# Patient Record
Sex: FEMALE | Race: WHITE | NOT HISPANIC OR LATINO | Employment: STUDENT | URBAN - METROPOLITAN AREA
[De-identification: names, ages, dates, MRNs, and addresses within clinical notes are randomized per-mention and may not be internally consistent; named-entity substitution may affect disease eponyms.]

---
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
## 2019-07-27 ENCOUNTER — TELEPHONE (OUTPATIENT)
Dept: OBSTETRICS AND GYNECOLOGY | Facility: CLINIC | Age: 19
End: 2019-07-27

## 2019-07-27 NOTE — TELEPHONE ENCOUNTER
Pt called c/o itching and yeast infection after using new soap. Requesting medication to be sent in. Informed pt per NP that she will send something in but if symptoms continue she would need to come in to be evaluated. Pt agree.

## 2019-07-29 ENCOUNTER — TELEPHONE (OUTPATIENT)
Dept: OBSTETRICS AND GYNECOLOGY | Facility: CLINIC | Age: 19
End: 2019-07-29

## 2019-07-29 RX ORDER — FLUCONAZOLE 150 MG/1
150 TABLET ORAL DAILY
Qty: 1 TABLET | Refills: 1 | Status: SHIPPED | OUTPATIENT
Start: 2019-07-29 | End: 2019-07-30

## 2019-07-29 NOTE — TELEPHONE ENCOUNTER
----- Message from Casey Serrano MA sent at 7/26/2019  2:03 PM CDT -----    Please refill the medication(s) listed below. Please call the patient when the prescription(s) is ready for  at this phone number   [phone] (H)       Medication #1   fluconazole (DIFLUCAN) 150 MG Tab           Preferred Pharmacy: [id number] - [place] [address] [address] AT [facility] [address]     [phone] (Phone)   [phone] (Fax)

## 2019-09-12 ENCOUNTER — OFFICE VISIT (OUTPATIENT)
Dept: OBSTETRICS AND GYNECOLOGY | Facility: CLINIC | Age: 19
End: 2019-09-12
Payer: MEDICAID

## 2019-09-12 VITALS
HEIGHT: 64 IN | SYSTOLIC BLOOD PRESSURE: 100 MMHG | DIASTOLIC BLOOD PRESSURE: 60 MMHG | WEIGHT: 227.06 LBS | BODY MASS INDEX: 38.76 KG/M2

## 2019-09-12 DIAGNOSIS — Z30.41 SURVEILLANCE FOR BIRTH CONTROL, ORAL CONTRACEPTIVES: Primary | ICD-10-CM

## 2019-09-12 DIAGNOSIS — Z01.419 WOMEN'S ANNUAL ROUTINE GYNECOLOGICAL EXAMINATION: ICD-10-CM

## 2019-09-12 PROCEDURE — 99213 OFFICE O/P EST LOW 20 MIN: CPT | Mod: PBBFAC | Performed by: NURSE PRACTITIONER

## 2019-09-12 PROCEDURE — 99395 PR PREVENTIVE VISIT,EST,18-39: ICD-10-PCS | Mod: S$PBB,,, | Performed by: NURSE PRACTITIONER

## 2019-09-12 PROCEDURE — 99999 PR PBB SHADOW E&M-EST. PATIENT-LVL III: CPT | Mod: PBBFAC,,, | Performed by: NURSE PRACTITIONER

## 2019-09-12 PROCEDURE — 99999 PR PBB SHADOW E&M-EST. PATIENT-LVL III: ICD-10-PCS | Mod: PBBFAC,,, | Performed by: NURSE PRACTITIONER

## 2019-09-12 PROCEDURE — 99395 PREV VISIT EST AGE 18-39: CPT | Mod: S$PBB,,, | Performed by: NURSE PRACTITIONER

## 2019-09-12 RX ORDER — NORGESTIMATE AND ETHINYL ESTRADIOL 0.25-0.035
1 KIT ORAL DAILY
Qty: 30 TABLET | Refills: 11 | Status: SHIPPED | OUTPATIENT
Start: 2019-09-12 | End: 2020-07-07 | Stop reason: SDUPTHER

## 2019-09-12 NOTE — PROGRESS NOTES
CC: Annual  HPI: Pt is a 19 y.o.  female who presents for routine annual exam. She uses OCPs for contraception, she reports satisfaction with this method and is requesting a refill. She does not want STD screening.  Denies any GYN complaints.  The patient participates in regular exercise: yes.  The patient does not smoke.  The patient wears seatbelts.   Pt denies any domestic violence.    No history of HTN/CVA/DVT/PE or migraine with aura. Occasional marijuana.     FH:  Breast cancer: Paternal Great Grandmother  Colon cancer: none  Ovarian cancer: none  Endometrial cancer: none    ROS:  GENERAL: Feeling well overall. Denies fever or chills.   SKIN: Denies rash or lesions.   HEAD: Denies head injury or headache.   NODES: Denies enlarged lymph nodes.   CHEST: Denies chest pain or shortness of breath.   CARDIOVASCULAR: Denies palpitations or left sided chest pain.   ABDOMEN: No abdominal pain, constipation, diarrhea, nausea, vomiting or rectal bleeding.   URINARY: No dysuria, hematuria, or burning on urination.  REPRODUCTIVE: See HPI.   BREASTS: Denies pain, lumps, or nipple discharge.   HEMATOLOGIC: No easy bruisability or excessive bleeding.   MUSCULOSKELETAL: Denies joint pain or swelling.   NEUROLOGIC: Denies syncope or weakness.   PSYCHIATRIC: Denies depression, anxiety or mood swings.    PE:   APPEARANCE: Well nourished, well developed, White female in no acute distress.  NODES: no cervical, supraclavicular, or inguinal lymphadenopathy  BREASTS: Symmetrical, no skin changes or visible lesions. No palpable masses, nipple discharge or adenopathy bilaterally.  ABDOMEN: Soft. No tenderness or masses. No distention. No hernias palpated. No CVA tenderness.  VULVA: No lesions. Normal external female genitalia.  URETHRAL MEATUS: Normal size and location, no lesions, no prolapse.  URETHRA: No masses, tenderness, or prolapse.  VAGINA: Moist. No lesions or lacerations noted. No abnormal discharge present. No odor present.    CERVIX: No lesions or discharge. No cervical motion tenderness.   UTERUS: Normal size, regular shape, mobile, non-tender.  ADNEXA: No tenderness. No fullness or masses palpated in the adnexal regions.   ANUS PERINEUM: Normal.      Diagnosis:  1. Surveillance for birth control, oral contraceptives    2. Women's annual routine gynecological examination        Plan:     Orders Placed This Encounter    norgestimate-ethinyl estradiol (ORTHO-CYCLEN) 0.25-35 mg-mcg per tablet     Ortho Cyclen refill.    The use of the oral contraceptive has been fully discussed with the patient. This includes the proper method to initiate and continue the pills, the need for regular compliance to ensure adequate contraceptive effect, the physiology which make the pill effective, the instructions for what to do in event of a missed pill, and warnings about anticipated minor side effects such as breakthrough spotting, nausea, breast tenderness, weight changes, acne, headaches, etc.  She has been told of the more serious potential side effects such as MI, stroke, and deep vein thrombosis, all of which are very unlikely.  She has been asked to report any signs of such serious problems immediately.  She should back up the pill with a condom during any cycle in which antibiotics are prescribed, and during the first cycle as well. The need for additional protection, such as a condom, to prevent exposure to sexually transmitted diseases has also been discussed- the patient has been clearly reminded that OCP's cannot protect her against diseases such as HIV and others. She understands and wishes to take the medication as prescribed.     Patient was counseled today on the new ACS guidelines for cervical cytology screening as well as the current recommendations for breast cancer screening. She was counseled to follow up with her PCP for other routine health maintenance. Counseling session lasted approximately 10 minutes, and all her questions were  answered.    Follow-up with me in 1 year for routine exam      Dory Buckley, RYAN-C

## 2020-07-06 ENCOUNTER — TELEPHONE (OUTPATIENT)
Dept: OBSTETRICS AND GYNECOLOGY | Facility: CLINIC | Age: 20
End: 2020-07-06

## 2020-07-06 NOTE — TELEPHONE ENCOUNTER
Spoke with pt advised she was prescribed 11 refills and to contact pharmacy for refill. Pt voiced understanding with no questions.

## 2020-07-06 NOTE — TELEPHONE ENCOUNTER
----- Message from Li Andrade sent at 7/6/2020 11:59 AM CDT -----  Regarding: refills  Pt Is Calling for Refills On The Following Medications       norgestimate-ethinyl estradiol (ORTHO-CYCLEN) 0.25-35 mg-mcg per tablet 30 tablet 11 9/12/2019 9/11/2020 No  Sig - Route: Take 1 tablet by mouth once daily. - Oral  Sent to pharmacy as: norgestimate-ethinyl estradiol (ORTHO-CYCLEN) 0.25-35 mg-mcg per tablet  Class: Normal            Called Into [facility] phone # [phone]      Pt Can Be Reached At [phone]

## 2020-07-07 ENCOUNTER — TELEPHONE (OUTPATIENT)
Dept: OBSTETRICS AND GYNECOLOGY | Facility: CLINIC | Age: 20
End: 2020-07-07

## 2020-07-07 DIAGNOSIS — Z01.419 WOMEN'S ANNUAL ROUTINE GYNECOLOGICAL EXAMINATION: ICD-10-CM

## 2020-07-07 DIAGNOSIS — Z30.41 SURVEILLANCE FOR BIRTH CONTROL, ORAL CONTRACEPTIVES: ICD-10-CM

## 2020-07-07 RX ORDER — NORGESTIMATE AND ETHINYL ESTRADIOL 0.25-0.035
1 KIT ORAL DAILY
Qty: 30 TABLET | Refills: 1 | Status: SHIPPED | OUTPATIENT
Start: 2020-07-07 | End: 2020-08-26 | Stop reason: SDUPTHER

## 2020-07-07 NOTE — TELEPHONE ENCOUNTER
Spoke with pt advised 2 more refills will be sent in to pharmacy, however she will need to make an appointment for her annual to continue to receive birth control. Pt voiced understanding with no questions.

## 2020-07-07 NOTE — TELEPHONE ENCOUNTER
----- Message from Arlene Ptitman sent at 7/7/2020 11:15 AM CDT -----  Patient is calling in reference to her Birthcontrol she spoke with someone yesterday and they stated she should have enough refills but pharmacy is telling her she needs another authorization.    Patient can be reached at [phone]

## 2020-08-26 DIAGNOSIS — Z30.41 SURVEILLANCE FOR BIRTH CONTROL, ORAL CONTRACEPTIVES: ICD-10-CM

## 2020-08-26 DIAGNOSIS — Z01.419 WOMEN'S ANNUAL ROUTINE GYNECOLOGICAL EXAMINATION: ICD-10-CM

## 2020-08-26 RX ORDER — NORGESTIMATE AND ETHINYL ESTRADIOL 0.25-0.035
1 KIT ORAL DAILY
Qty: 30 TABLET | Refills: 1 | Status: SHIPPED | OUTPATIENT
Start: 2020-08-26 | End: 2020-09-30 | Stop reason: SDUPTHER

## 2020-08-26 NOTE — TELEPHONE ENCOUNTER
----- Message from Arlene Pittman sent at 8/26/2020 11:08 AM CDT -----    Patient is requesting a refill on her birthcontrol she does not have enough to last until her appt.    Patient can be reached at [phone]        Patient pharmacy [id number] [place] [facility] [place] [facility] [address] [phone] (Phone) [phone] (Fax)

## 2020-08-26 NOTE — TELEPHONE ENCOUNTER
----- Message from Arlene Pittman sent at 8/26/2020 11:08 AM CDT -----    Patient is requesting a refill on her birthcontrol she does not have enough to last until her appt.    Patient can be reached at [phone]        Patient pharmacy is [id number] [facility] [place] [facility] [address] [phone] (Phone) [phone] (Fax)

## 2020-09-12 ENCOUNTER — NURSE TRIAGE (OUTPATIENT)
Dept: ADMINISTRATIVE | Facility: CLINIC | Age: 20
End: 2020-09-12

## 2020-09-12 NOTE — TELEPHONE ENCOUNTER
Reason for Disposition   Blood in urine (red, pink, or tea-colored)     TAKING AZO yesterday, urine orange, saw pink in the urine prior to the AZO    Additional Information   Negative: Shock suspected (e.g., cold/pale/clammy skin, too weak to stand, low BP, rapid pulse)   Negative: Sounds like a life-threatening emergency to the triager   Negative: Followed a genital area injury   Negative: Taking antibiotic for urinary tract infection (UTI)   Negative: Pregnant   Negative: Postpartum < 1 month   Negative: [1] Unable to urinate (or only a few drops) > 4 hours AND     [2] bladder feels very full (e.g., palpable bladder or strong urge to urinate)   Negative: Patient sounds very sick or weak to the triager   Negative: [1] SEVERE pain with urination  (e.g., excruciating) AND [2] not improved after 2 hours of pain medicine and Sitz bath   Negative: Fever > 100.5 F (38.1 C)   Negative: Side (flank) or lower back pain present   Negative: Diabetes mellitus or weak immune system (e.g., HIV positive, cancer chemotherapy, transplant patient)   Negative: Bedridden (e.g., nursing home patient, CVA, chronic illness, recovering from surgery)   Negative: Artificial heart valve or artificial joint   Negative: Unusual vaginal discharge (e.g., bad smelling, yellow, green, or foamy-white)   Negative: Patient is worried about sexually transmitted disease (STD)   Negative: Possibility of pregnancy    Protocols used: ST URINATION PAIN - FEMALE-A-    Recommend she be seen within 24 hours for urinary urgency, burning, frequency.  No fever, but did have some pink tinge to urine, this was prior to taking AZO, which she took last night for the discomfort.  Now her urine is orange.  Provided location and phone number for [facility].  She also knows of others near \[facility]", which is where she lives.

## 2020-09-30 ENCOUNTER — LAB VISIT (OUTPATIENT)
Dept: LAB | Facility: OTHER | Age: 20
End: 2020-09-30
Attending: NURSE PRACTITIONER
Payer: MEDICAID

## 2020-09-30 ENCOUNTER — OFFICE VISIT (OUTPATIENT)
Dept: OBSTETRICS AND GYNECOLOGY | Facility: CLINIC | Age: 20
End: 2020-09-30
Payer: MEDICAID

## 2020-09-30 VITALS
HEIGHT: 64 IN | SYSTOLIC BLOOD PRESSURE: 140 MMHG | DIASTOLIC BLOOD PRESSURE: 80 MMHG | BODY MASS INDEX: 36.4 KG/M2 | WEIGHT: 213.19 LBS

## 2020-09-30 DIAGNOSIS — Z11.3 SCREENING EXAMINATION FOR STD (SEXUALLY TRANSMITTED DISEASE): ICD-10-CM

## 2020-09-30 DIAGNOSIS — Z01.419 WOMEN'S ANNUAL ROUTINE GYNECOLOGICAL EXAMINATION: ICD-10-CM

## 2020-09-30 DIAGNOSIS — Z01.419 WOMEN'S ANNUAL ROUTINE GYNECOLOGICAL EXAMINATION: Primary | ICD-10-CM

## 2020-09-30 DIAGNOSIS — Z30.41 SURVEILLANCE FOR BIRTH CONTROL, ORAL CONTRACEPTIVES: ICD-10-CM

## 2020-09-30 DIAGNOSIS — Z12.4 PAP SMEAR FOR CERVICAL CANCER SCREENING: ICD-10-CM

## 2020-09-30 LAB
ALBUMIN SERPL BCP-MCNC: 4.3 G/DL (ref 3.5–5.2)
ALP SERPL-CCNC: 45 U/L (ref 55–135)
ALT SERPL W/O P-5'-P-CCNC: 11 U/L (ref 10–44)
ANION GAP SERPL CALC-SCNC: 12 MMOL/L (ref 8–16)
AST SERPL-CCNC: 13 U/L (ref 10–40)
BASOPHILS # BLD AUTO: 0.06 K/UL (ref 0–0.2)
BASOPHILS NFR BLD: 0.7 % (ref 0–1.9)
BILIRUB SERPL-MCNC: 0.5 MG/DL (ref 0.1–1)
BUN SERPL-MCNC: 9 MG/DL (ref 6–20)
CALCIUM SERPL-MCNC: 10 MG/DL (ref 8.7–10.5)
CHLORIDE SERPL-SCNC: 103 MMOL/L (ref 95–110)
CO2 SERPL-SCNC: 26 MMOL/L (ref 23–29)
CREAT SERPL-MCNC: 0.7 MG/DL (ref 0.5–1.4)
DIFFERENTIAL METHOD: ABNORMAL
EOSINOPHIL # BLD AUTO: 0.1 K/UL (ref 0–0.5)
EOSINOPHIL NFR BLD: 1.2 % (ref 0–8)
ERYTHROCYTE [DISTWIDTH] IN BLOOD BY AUTOMATED COUNT: 13.5 % (ref 11.5–14.5)
EST. GFR  (AFRICAN AMERICAN): >60 ML/MIN/1.73 M^2
EST. GFR  (NON AFRICAN AMERICAN): >60 ML/MIN/1.73 M^2
GLUCOSE SERPL-MCNC: 102 MG/DL (ref 70–110)
HCT VFR BLD AUTO: 42.7 % (ref 37–48.5)
HGB BLD-MCNC: 13.7 G/DL (ref 12–16)
IMM GRANULOCYTES # BLD AUTO: 0.02 K/UL (ref 0–0.04)
IMM GRANULOCYTES NFR BLD AUTO: 0.2 % (ref 0–0.5)
LYMPHOCYTES # BLD AUTO: 1.9 K/UL (ref 1–4.8)
LYMPHOCYTES NFR BLD: 23.8 % (ref 18–48)
MCH RBC QN AUTO: 27.2 PG (ref 27–31)
MCHC RBC AUTO-ENTMCNC: 32.1 G/DL (ref 32–36)
MCV RBC AUTO: 85 FL (ref 82–98)
MONOCYTES # BLD AUTO: 0.7 K/UL (ref 0.3–1)
MONOCYTES NFR BLD: 8.6 % (ref 4–15)
NEUTROPHILS # BLD AUTO: 5.3 K/UL (ref 1.8–7.7)
NEUTROPHILS NFR BLD: 65.5 % (ref 38–73)
NRBC BLD-RTO: 0 /100 WBC
PLATELET # BLD AUTO: 372 K/UL (ref 150–350)
PMV BLD AUTO: 10.7 FL (ref 9.2–12.9)
POTASSIUM SERPL-SCNC: 4.2 MMOL/L (ref 3.5–5.1)
PROT SERPL-MCNC: 7.5 G/DL (ref 6–8.4)
RBC # BLD AUTO: 5.04 M/UL (ref 4–5.4)
SODIUM SERPL-SCNC: 141 MMOL/L (ref 136–145)
WBC # BLD AUTO: 8.06 K/UL (ref 3.9–12.7)

## 2020-09-30 PROCEDURE — 87480 CANDIDA DNA DIR PROBE: CPT

## 2020-09-30 PROCEDURE — 99999 PR PBB SHADOW E&M-EST. PATIENT-LVL III: CPT | Mod: PBBFAC,,, | Performed by: NURSE PRACTITIONER

## 2020-09-30 PROCEDURE — 80074 ACUTE HEPATITIS PANEL: CPT

## 2020-09-30 PROCEDURE — 99395 PREV VISIT EST AGE 18-39: CPT | Mod: S$PBB,,, | Performed by: NURSE PRACTITIONER

## 2020-09-30 PROCEDURE — 87510 GARDNER VAG DNA DIR PROBE: CPT

## 2020-09-30 PROCEDURE — 99213 OFFICE O/P EST LOW 20 MIN: CPT | Mod: PBBFAC | Performed by: NURSE PRACTITIONER

## 2020-09-30 PROCEDURE — 88142 CYTOPATH C/V THIN LAYER: CPT

## 2020-09-30 PROCEDURE — 85025 COMPLETE CBC W/AUTO DIFF WBC: CPT

## 2020-09-30 PROCEDURE — 99999 PR PBB SHADOW E&M-EST. PATIENT-LVL III: ICD-10-PCS | Mod: PBBFAC,,, | Performed by: NURSE PRACTITIONER

## 2020-09-30 PROCEDURE — 86703 HIV-1/HIV-2 1 RESULT ANTBDY: CPT

## 2020-09-30 PROCEDURE — 36415 COLL VENOUS BLD VENIPUNCTURE: CPT

## 2020-09-30 PROCEDURE — 86592 SYPHILIS TEST NON-TREP QUAL: CPT

## 2020-09-30 PROCEDURE — 99395 PR PREVENTIVE VISIT,EST,18-39: ICD-10-PCS | Mod: S$PBB,,, | Performed by: NURSE PRACTITIONER

## 2020-09-30 PROCEDURE — 80053 COMPREHEN METABOLIC PANEL: CPT

## 2020-09-30 RX ORDER — NORGESTIMATE AND ETHINYL ESTRADIOL 0.25-0.035
1 KIT ORAL DAILY
Qty: 30 TABLET | Refills: 1 | Status: SHIPPED | OUTPATIENT
Start: 2020-09-30 | End: 2021-09-30

## 2020-09-30 NOTE — PROGRESS NOTES
CC: Annual  HPI: Pt is a 20 y.o.  female who presents for routine annual exam. She uses OCPs for contraception. She does want STD screening.  Denies any GYN complaints.  The patient participates in regular exercise: Yes.  The patient does not smoke.  The patient wears seatbelts.   Pt denies any domestic violence.     FH:  Breast cancer: none  Colon cancer: none  Ovarian cancer: none  Endometrial cancer: none    ROS:  GENERAL: Feeling well overall. Denies fever or chills.   SKIN: Denies rash or lesions.   HEAD: Denies head injury or headache.   NODES: Denies enlarged lymph nodes.   CHEST: Denies chest pain or shortness of breath.   CARDIOVASCULAR: Denies palpitations or left sided chest pain.   ABDOMEN: No abdominal pain, constipation, diarrhea, nausea, vomiting or rectal bleeding.   URINARY: No dysuria, hematuria, or burning on urination.  REPRODUCTIVE: See HPI.   BREASTS: Denies pain, lumps, or nipple discharge.   HEMATOLOGIC: No easy bruisability or excessive bleeding.   MUSCULOSKELETAL: Denies joint pain or swelling.   NEUROLOGIC: Denies syncope or weakness.   PSYCHIATRIC: Denies depression, anxiety or mood swings.    PE:   APPEARANCE: Well nourished, well developed, White female in no acute distress.  NODES: no cervical, supraclavicular, or inguinal lymphadenopathy  BREASTS: Symmetrical, no skin changes or visible lesions. No palpable masses, nipple discharge or adenopathy bilaterally.  ABDOMEN: Soft. No tenderness or masses. No distention. No hernias palpated. No CVA tenderness.  VULVA: No lesions. Normal external female genitalia.  URETHRAL MEATUS: Normal size and location, no lesions, no prolapse.  URETHRA: No masses, tenderness, or prolapse.  VAGINA: Moist. No lesions or lacerations noted. No abnormal discharge present. No odor present.   CERVIX: No lesions or discharge. No cervical motion tenderness.   UTERUS: Normal size, regular shape, mobile, non-tender.  ADNEXA: No tenderness. No fullness or masses  palpated in the adnexal regions.   ANUS PERINEUM: Normal.      Diagnosis:  1. Women's annual routine gynecological examination    2. Screening examination for STD (sexually transmitted disease)    3. Pap smear for cervical cancer screening    4. Surveillance for birth control, oral contraceptives        Plan:     Orders Placed This Encounter    C. trachomatis/N. gonorrhoeae by AMP DNA [facility]; Cervicovaginal    Vaginosis Screen by DNA Probe    HIV 1/2 Ag/Ab (4th Gen)    RPR    Hepatitis Panel, Acute    CBC auto differential    Comprehensive metabolic panel    Liquid-Based Pap Smear, Screening    norgestimate-ethinyl estradioL (ORTHO-CYCLEN) 0.25-35 mg-mcg per tablet       Patient was counseled today on the new ACS guidelines for cervical cytology screening as well as the current recommendations for breast cancer screening. She was counseled to follow up with her PCP for other routine health maintenance. Counseling session lasted approximately 10 minutes, and all her questions were answered.    Follow-up with me in 1 year for routine exam      JEFF Calabrese     2018

## 2020-09-30 NOTE — MEDICAL/APP STUDENT
"Subjective:       Patient ID: Kerry Torres is a 20 y.o. female.    Chief Complaint: Annual Exam (STD check)    Patient denies any acute problems apart from headaches during first "couple days of my cycle". Reports relief with OTC medication. Denies dysuria, vaginal discharge or foul odor, abdominal cramping or pain. Patient exercises regularly and follows a healthy diet. Reports consistent condom use with monogamous partner of four years. Denies any sexual or domestic violence issues. Smokes marijuana occasionally but denies alcohol or tobacco use.     Review of Systems   Constitutional: Negative.    HENT: Negative.    Eyes: Negative.    Respiratory: Negative.    Gastrointestinal: Negative.    Endocrine: Negative.    Genitourinary: Negative.    Musculoskeletal: Negative.    Integumentary:  Negative.   Allergic/Immunologic: Negative.    Neurological: Positive for headaches.   Hematological: Negative.    Psychiatric/Behavioral: Negative.          Objective:      Physical Exam  Exam conducted with a chaperone present.   Constitutional:       Appearance: Normal appearance.   HENT:      Head: Normocephalic.      Nose: Nose normal.      Mouth/Throat:      Mouth: Mucous membranes are moist.      Pharynx: Oropharynx is clear.   Eyes:      Conjunctiva/sclera: Conjunctivae normal.   Neck:      Musculoskeletal: Normal range of motion and neck supple.      Thyroid: No thyroid mass, thyromegaly or thyroid tenderness.      Trachea: Trachea normal.   Cardiovascular:      Pulses: Normal pulses.   Pulmonary:      Effort: Pulmonary effort is normal.      Breath sounds: Normal breath sounds.   Chest:      Breasts:         Right: Normal.         Left: Normal.   Abdominal:      General: Abdomen is flat.      Palpations: Abdomen is soft.   Genitourinary:     General: Normal vulva.      Exam position: Lithotomy position.      Vagina: Vaginal discharge present.      Rectum: Normal.      Comments: Aditya blood, patient on fourth day of " menstrual cycle   Musculoskeletal: Normal range of motion.      Right lower leg: No edema.      Left lower leg: No edema.   Skin:     General: Skin is warm and dry.      Capillary Refill: Capillary refill takes less than 2 seconds.   Neurological:      General: No focal deficit present.      Mental Status: She is alert.   Psychiatric:         Attention and Perception: Attention and perception normal.         Mood and Affect: Mood normal.         Speech: Speech normal.         Behavior: Behavior normal. Behavior is cooperative.         Thought Content: Thought content normal.         Cognition and Memory: Cognition and memory normal.         Judgment: Judgment normal.         Assessment:       1. Screening examination for STD (sexually transmitted disease)    2. Pap smear for cervical cancer screening    3. Surveillance for birth control, oral contraceptives    4. Women's annual routine gynecological examination        Plan:     STD screening: Chalamydia, gonorrhea, trichmonaisis, HIV, Hepatitis panel, RPR, and pap smear. Follow-up with NP as needed

## 2020-10-01 ENCOUNTER — PATIENT MESSAGE (OUTPATIENT)
Dept: OBSTETRICS AND GYNECOLOGY | Facility: CLINIC | Age: 20
End: 2020-10-01

## 2020-10-01 DIAGNOSIS — B96.89 BACTERIAL VAGINOSIS: Primary | ICD-10-CM

## 2020-10-01 DIAGNOSIS — N76.0 BACTERIAL VAGINOSIS: Primary | ICD-10-CM

## 2020-10-01 LAB
C TRACH RRNA SPEC QL NAA+PROBE: NEGATIVE
CANDIDA RRNA VAG QL PROBE: NEGATIVE
G VAGINALIS RRNA GENITAL QL PROBE: POSITIVE
HAV IGM SERPL QL IA: NEGATIVE
HBV CORE IGM SERPL QL IA: NEGATIVE
HBV SURFACE AG SERPL QL IA: NEGATIVE
HCV AB SERPL QL IA: NEGATIVE
HIV 1+2 AB+HIV1 P24 AG SERPL QL IA: NEGATIVE
N GONORRHOEA RRNA SPEC QL NAA+PROBE: NEGATIVE
RPR SER QL: NORMAL
T VAGINALIS RRNA GENITAL QL PROBE: NEGATIVE

## 2020-10-01 RX ORDER — METRONIDAZOLE 500 MG/1
500 TABLET ORAL 2 TIMES DAILY
Qty: 14 TABLET | Refills: 0 | Status: SHIPPED | OUTPATIENT
Start: 2020-10-01 | End: 2020-10-08

## 2020-10-22 LAB
FINAL PATHOLOGIC DIAGNOSIS: NORMAL
Lab: NORMAL

## 2020-10-28 DIAGNOSIS — Z30.41 SURVEILLANCE FOR BIRTH CONTROL, ORAL CONTRACEPTIVES: ICD-10-CM

## 2020-10-28 DIAGNOSIS — Z01.419 WOMEN'S ANNUAL ROUTINE GYNECOLOGICAL EXAMINATION: ICD-10-CM

## 2020-10-30 ENCOUNTER — PATIENT MESSAGE (OUTPATIENT)
Dept: OBSTETRICS AND GYNECOLOGY | Facility: CLINIC | Age: 20
End: 2020-10-30

## 2020-10-30 DIAGNOSIS — Z30.41 SURVEILLANCE FOR BIRTH CONTROL, ORAL CONTRACEPTIVES: Primary | ICD-10-CM

## 2020-10-30 RX ORDER — NORGESTIMATE AND ETHINYL ESTRADIOL 0.25-0.035
1 KIT ORAL DAILY
Qty: 28 TABLET | Refills: 11 | Status: SHIPPED | OUTPATIENT
Start: 2020-10-30 | End: 2020-11-27 | Stop reason: SDUPTHER

## 2020-10-30 RX ORDER — NORGESTIMATE AND ETHINYL ESTRADIOL 0.25-0.035
1 KIT ORAL DAILY
Qty: 30 TABLET | Refills: 11 | OUTPATIENT
Start: 2020-10-30 | End: 2021-10-30

## 2020-11-27 ENCOUNTER — TELEPHONE (OUTPATIENT)
Dept: OBSTETRICS AND GYNECOLOGY | Facility: CLINIC | Age: 20
End: 2020-11-27
Payer: MEDICAID

## 2020-11-27 DIAGNOSIS — Z01.419 WOMEN'S ANNUAL ROUTINE GYNECOLOGICAL EXAMINATION: ICD-10-CM

## 2020-11-27 DIAGNOSIS — Z30.41 SURVEILLANCE FOR BIRTH CONTROL, ORAL CONTRACEPTIVES: ICD-10-CM

## 2020-11-27 RX ORDER — NORGESTIMATE AND ETHINYL ESTRADIOL 0.25-0.035
1 KIT ORAL DAILY
Qty: 90 TABLET | Refills: 3 | Status: SHIPPED | OUTPATIENT
Start: 2020-11-27 | End: 2021-02-09 | Stop reason: SDUPTHER

## 2021-02-06 ENCOUNTER — NURSE TRIAGE (OUTPATIENT)
Dept: ADMINISTRATIVE | Facility: CLINIC | Age: 21
End: 2021-02-06

## 2021-02-09 DIAGNOSIS — Z01.419 WOMEN'S ANNUAL ROUTINE GYNECOLOGICAL EXAMINATION: ICD-10-CM

## 2021-02-09 DIAGNOSIS — Z30.41 SURVEILLANCE FOR BIRTH CONTROL, ORAL CONTRACEPTIVES: ICD-10-CM

## 2021-02-17 RX ORDER — NORGESTIMATE AND ETHINYL ESTRADIOL 0.25-0.035
1 KIT ORAL DAILY
Qty: 90 TABLET | Refills: 3 | Status: SHIPPED | OUTPATIENT
Start: 2021-02-17 | End: 2022-02-17

## 2021-04-16 ENCOUNTER — PATIENT MESSAGE (OUTPATIENT)
Dept: RESEARCH | Facility: HOSPITAL | Age: 21
End: 2021-04-16

## 2021-09-23 ENCOUNTER — CLINICAL SUPPORT (OUTPATIENT)
Dept: URGENT CARE | Facility: CLINIC | Age: 21
End: 2021-09-23
Payer: MEDICAID

## 2021-09-23 DIAGNOSIS — Z11.52 ENCOUNTER FOR SCREENING LABORATORY TESTING FOR COVID-19 VIRUS IN ASYMPTOMATIC PATIENT: Primary | ICD-10-CM

## 2021-09-23 DIAGNOSIS — Z01.812 ENCOUNTER FOR SCREENING LABORATORY TESTING FOR COVID-19 VIRUS IN ASYMPTOMATIC PATIENT: Primary | ICD-10-CM

## 2021-09-23 LAB
CTP QC/QA: YES
SARS-COV-2 RDRP RESP QL NAA+PROBE: NEGATIVE

## 2021-09-23 PROCEDURE — U0002 COVID-19 LAB TEST NON-CDC: HCPCS | Mod: QW,S$GLB,, | Performed by: PHYSICIAN ASSISTANT

## 2021-09-23 PROCEDURE — U0002: ICD-10-PCS | Mod: QW,S$GLB,, | Performed by: PHYSICIAN ASSISTANT

## 2021-09-23 PROCEDURE — 99211 PR OFFICE/OUTPT VISIT, EST, LEVL I: ICD-10-PCS | Mod: S$GLB,CS,, | Performed by: PHYSICIAN ASSISTANT

## 2021-09-23 PROCEDURE — 99211 OFF/OP EST MAY X REQ PHY/QHP: CPT | Mod: S$GLB,CS,, | Performed by: PHYSICIAN ASSISTANT

## 2021-11-06 ENCOUNTER — OFFICE VISIT (OUTPATIENT)
Dept: URGENT CARE | Facility: CLINIC | Age: 21
End: 2021-11-06
Payer: MEDICAID

## 2021-11-06 VITALS
HEART RATE: 66 BPM | TEMPERATURE: 98 F | WEIGHT: 213 LBS | HEIGHT: 64 IN | BODY MASS INDEX: 36.37 KG/M2 | RESPIRATION RATE: 20 BRPM | DIASTOLIC BLOOD PRESSURE: 75 MMHG | OXYGEN SATURATION: 100 % | SYSTOLIC BLOOD PRESSURE: 115 MMHG

## 2021-11-06 DIAGNOSIS — J06.9 VIRAL URI: Primary | ICD-10-CM

## 2021-11-06 PROCEDURE — 99212 OFFICE O/P EST SF 10 MIN: CPT | Mod: S$GLB,,, | Performed by: EMERGENCY MEDICINE

## 2021-11-06 PROCEDURE — 99212 PR OFFICE/OUTPT VISIT, EST, LEVL II, 10-19 MIN: ICD-10-PCS | Mod: S$GLB,,, | Performed by: EMERGENCY MEDICINE

## 2021-11-10 ENCOUNTER — TELEPHONE (OUTPATIENT)
Dept: URGENT CARE | Facility: CLINIC | Age: 21
End: 2021-11-10
Payer: MEDICAID

## 2021-12-23 ENCOUNTER — CLINICAL SUPPORT (OUTPATIENT)
Dept: URGENT CARE | Facility: CLINIC | Age: 21
End: 2021-12-23
Payer: MEDICAID

## 2021-12-23 DIAGNOSIS — Z11.52 ENCOUNTER FOR SCREENING LABORATORY TESTING FOR COVID-19 VIRUS IN ASYMPTOMATIC PATIENT: Primary | ICD-10-CM

## 2021-12-23 DIAGNOSIS — Z01.812 ENCOUNTER FOR SCREENING LABORATORY TESTING FOR COVID-19 VIRUS IN ASYMPTOMATIC PATIENT: Primary | ICD-10-CM

## 2021-12-23 LAB
CTP QC/QA: YES
SARS-COV-2 RDRP RESP QL NAA+PROBE: NEGATIVE

## 2021-12-23 PROCEDURE — U0002: ICD-10-PCS | Mod: QW,S$GLB,, | Performed by: NURSE PRACTITIONER

## 2021-12-23 PROCEDURE — 99211 OFF/OP EST MAY X REQ PHY/QHP: CPT | Mod: S$GLB,,, | Performed by: NURSE PRACTITIONER

## 2021-12-23 PROCEDURE — U0002 COVID-19 LAB TEST NON-CDC: HCPCS | Mod: QW,S$GLB,, | Performed by: NURSE PRACTITIONER

## 2021-12-23 PROCEDURE — 99211 PR OFFICE/OUTPT VISIT, EST, LEVL I: ICD-10-PCS | Mod: S$GLB,,, | Performed by: NURSE PRACTITIONER

## 2022-10-12 ENCOUNTER — OFFICE VISIT (OUTPATIENT)
Dept: URGENT CARE | Facility: CLINIC | Age: 22
End: 2022-10-12
Payer: MEDICAID

## 2022-10-12 ENCOUNTER — HOSPITAL ENCOUNTER (EMERGENCY)
Facility: HOSPITAL | Age: 22
Discharge: HOME | End: 2022-10-12
Attending: FAMILY MEDICINE
Payer: MEDICAID

## 2022-10-12 ENCOUNTER — TELEPHONE (OUTPATIENT)
Dept: URGENT CARE | Facility: CLINIC | Age: 22
End: 2022-10-12

## 2022-10-12 VITALS
OXYGEN SATURATION: 99 % | HEART RATE: 83 BPM | BODY MASS INDEX: 36.37 KG/M2 | SYSTOLIC BLOOD PRESSURE: 122 MMHG | WEIGHT: 213 LBS | HEIGHT: 64 IN | DIASTOLIC BLOOD PRESSURE: 64 MMHG | TEMPERATURE: 98 F | RESPIRATION RATE: 18 BRPM

## 2022-10-12 VITALS
TEMPERATURE: 99 F | DIASTOLIC BLOOD PRESSURE: 66 MMHG | WEIGHT: 181.88 LBS | HEART RATE: 99 BPM | BODY MASS INDEX: 32.23 KG/M2 | OXYGEN SATURATION: 97 % | HEIGHT: 63 IN | SYSTOLIC BLOOD PRESSURE: 122 MMHG | RESPIRATION RATE: 18 BRPM

## 2022-10-12 DIAGNOSIS — E86.0 DEHYDRATION: ICD-10-CM

## 2022-10-12 DIAGNOSIS — J10.1 INFLUENZA A: Primary | ICD-10-CM

## 2022-10-12 DIAGNOSIS — R05.9 COUGH, UNSPECIFIED TYPE: ICD-10-CM

## 2022-10-12 LAB
CTP QC/QA: YES
CTP QC/QA: YES
POC MOLECULAR INFLUENZA A AGN: POSITIVE
POC MOLECULAR INFLUENZA B AGN: NEGATIVE
SARS-COV-2 RDRP RESP QL NAA+PROBE: NEGATIVE

## 2022-10-12 PROCEDURE — 3078F PR MOST RECENT DIASTOLIC BLOOD PRESSURE < 80 MM HG: ICD-10-PCS | Mod: CPTII,S$GLB,,

## 2022-10-12 PROCEDURE — 96360 HYDRATION IV INFUSION INIT: CPT

## 2022-10-12 PROCEDURE — 87502 INFLUENZA DNA AMP PROBE: CPT | Mod: QW,S$GLB,,

## 2022-10-12 PROCEDURE — 99213 PR OFFICE/OUTPT VISIT, EST, LEVL III, 20-29 MIN: ICD-10-PCS | Mod: S$GLB,,,

## 2022-10-12 PROCEDURE — 87635: ICD-10-PCS | Mod: QW,S$GLB,,

## 2022-10-12 PROCEDURE — 87502 POCT INFLUENZA A/B MOLECULAR: ICD-10-PCS | Mod: QW,S$GLB,,

## 2022-10-12 PROCEDURE — 3074F PR MOST RECENT SYSTOLIC BLOOD PRESSURE < 130 MM HG: ICD-10-PCS | Mod: CPTII,S$GLB,,

## 2022-10-12 PROCEDURE — 3078F DIAST BP <80 MM HG: CPT | Mod: CPTII,S$GLB,,

## 2022-10-12 PROCEDURE — 99213 OFFICE O/P EST LOW 20 MIN: CPT | Mod: S$GLB,,,

## 2022-10-12 PROCEDURE — 87635 SARS-COV-2 COVID-19 AMP PRB: CPT | Mod: QW,S$GLB,,

## 2022-10-12 PROCEDURE — 1160F PR REVIEW ALL MEDS BY PRESCRIBER/CLIN PHARMACIST DOCUMENTED: ICD-10-PCS | Mod: CPTII,S$GLB,,

## 2022-10-12 PROCEDURE — 25000003 PHARM REV CODE 250: Performed by: NURSE PRACTITIONER

## 2022-10-12 PROCEDURE — 1159F MED LIST DOCD IN RCRD: CPT | Mod: CPTII,S$GLB,,

## 2022-10-12 PROCEDURE — 1159F PR MEDICATION LIST DOCUMENTED IN MEDICAL RECORD: ICD-10-PCS | Mod: CPTII,S$GLB,,

## 2022-10-12 PROCEDURE — 3008F PR BODY MASS INDEX (BMI) DOCUMENTED: ICD-10-PCS | Mod: CPTII,S$GLB,,

## 2022-10-12 PROCEDURE — 3074F SYST BP LT 130 MM HG: CPT | Mod: CPTII,S$GLB,,

## 2022-10-12 PROCEDURE — 99284 EMERGENCY DEPT VISIT MOD MDM: CPT | Mod: 25

## 2022-10-12 PROCEDURE — 1160F RVW MEDS BY RX/DR IN RCRD: CPT | Mod: CPTII,S$GLB,,

## 2022-10-12 PROCEDURE — 3008F BODY MASS INDEX DOCD: CPT | Mod: CPTII,S$GLB,,

## 2022-10-12 RX ORDER — ATOMOXETINE 40 MG/1
40 CAPSULE ORAL EVERY MORNING
COMMUNITY
Start: 2022-10-06

## 2022-10-12 RX ORDER — OSELTAMIVIR PHOSPHATE 75 MG/1
75 CAPSULE ORAL 2 TIMES DAILY
Qty: 10 CAPSULE | Refills: 0 | Status: SHIPPED | OUTPATIENT
Start: 2022-10-12 | End: 2022-10-17

## 2022-10-12 RX ORDER — ONDANSETRON 4 MG/1
4 TABLET, ORALLY DISINTEGRATING ORAL EVERY 6 HOURS PRN
Qty: 20 TABLET | Refills: 0 | Status: SHIPPED | OUTPATIENT
Start: 2022-10-12

## 2022-10-12 RX ORDER — IBUPROFEN 800 MG/1
800 TABLET ORAL EVERY 6 HOURS PRN
Qty: 20 TABLET | Refills: 0 | Status: SHIPPED | OUTPATIENT
Start: 2022-10-12

## 2022-10-12 RX ADMIN — SODIUM CHLORIDE 1000 ML: 9 INJECTION, SOLUTION INTRAVENOUS at 09:10

## 2022-10-12 NOTE — TELEPHONE ENCOUNTER
Patient came in this morning and was seen by provider Estefanía Mueller. She's stating that she feels she need IV fluids and would she be able to come in and get them here. I explained to pt that it would be based on the providers assessment if they felt she needed fluids. I also explained to her that she can go back to urgent care to be seen and assessed or she could go to the emergency room. Pt thanked me and ended call. //BJ

## 2022-10-12 NOTE — PATIENT INSTRUCTIONS
FLU  Your Rapid FLU test was POSITIVE FOR INFLUENZA  If your condition worsens or fails to improve we recommend that you receive another evaluation at the ER immediately or contact your PCP to discuss your concerns or return here. You must understand that you've received an urgent care treatment only and that you may be released before all your medical problems are known or treated. You the patient will arrange for follouwp care as instructed.     -  Take full course of Tamilfu as prescribed     -  Rest and fluids are also important.   -  Tylenol or ibuprofen can also be used as directed for pain unless you have an allergy to them or medical condition such as stomach ulcers, kidney or liver disease or blood thinners etc for which you should not be taking these type of medications.   - Do not share any utensils or share drinks   - Wash hands frequently         - You can take over-the-counter claritin, zyrtec, allegra, or xyzal as directed. These are antihistamines that can help with runny nose, nasal congestion, sneezing, and helps to dry up post-nasal drip, which usually causes sore throat and cough.               - If you do NOT have high blood pressure, you may use a decongestant form (D)  of this medication (ie. Claritin- D, zyrtec-D, allegra-D) or if you do not take the D form, you can take sudafed (pseudoephedrine) over the counter, which is a decongestant. Do NOT take two decongestant (D) medications at the same time (such as mucinex-D and claritin-D or plain sudafed and claritin D). Dextromethorphan (DM) is a cough suppressant over the counter (ie. mucinex DM, robitussin, delsym; dayquil/nyquil has DM as well.)    -If you DO have high blood pressure, you may take Coricidin HBP for sinus congestion and Delsym for cough.      - You can use Flonase (fluticasone) nasal spray as directed for sinus congestion and postnasal drip. This is a steroid nasal spray that works locally over time to decrease the  inflammation in your nose/sinuses and help with allergic symptoms. This is not an quick- relief spray like afrin, but it works well if used daily.  Discontinue if you develop nose bleed  - Use nasal saline prior to Flonase.  - Use Ocean Spray Nasal Saline 1-3 puffs each nostril every 2-3 hours then blow out onto tissue. This is to irrigate the nasal passage way to clear the sinus openings. Use until sinus problem resolved.     - You can take plain Mucinex (guaifenesin) 1200 mg twice a day to help loosen mucous.      - Warm salt water gargles can help with sore throat     - Warm tea with honey can help with cough. Honey is a natural cough suppressant.    - Acetaminophen (tylenol) or Ibuprofen (advil,motrin) as directed as needed for fever/pain. Avoid tylenol if you have a history of liver disease. Do not take ibuprofen if you have a history of GI bleeding, kidney disease, or if you take blood thinners.     - You must understand that you have received an Urgent Care treatment only and that you may be released before all of your medical problems are known or treated.   - You, the patient, will arrange for follow up care as instructed.   - If your condition worsens or fails to improve we recommend that you receive another evaluation at the ER immediately or contact your PCP to discuss your concerns or return here.   - Follow up with your PCP or specialty clinic as directed in the next 1-2 weeks if not improved or as needed.  You can call [phone] to schedule an appointment with the appropriate provider.

## 2022-10-12 NOTE — LETTER
October 12, 2022      [facility]  [address] [place] [address] [address]  [place] [address]  Phone: [phone]  Fax: [phone]       Patient: Kerry Torres   YOB: 2000  Date of Visit: 10/12/2022    To Whom It May Concern:    Bridger Torres  was at [facility] on 10/12/2022. The patient may return to work/school on 10/15/2022 with no restrictions OR sooner IF fever free for 24 hours (fever is 100.4F or greater) and symptoms are improving.  . If you have any questions or concerns, or if I can be of further assistance, please do not hesitate to contact me.    Sincerely,          Estefanía Mueller PA-C

## 2022-10-12 NOTE — PROGRESS NOTES
"Subjective:       Patient ID: Kerry Torres is a 22 y.o. female.    Vitals:  height is 5' 4" (1.626 m) and weight is 96.6 kg (213 lb). Her temperature is 98 °F (36.7 °C). Her blood pressure is 122/64 and her pulse is 83. Her respiration is 18 and oxygen saturation is 99%.     Chief Complaint: Cough    Pt present for cough that started yesterday. Pt states she has been exposed to the flu.    Cough  This is a new problem. The current episode started yesterday. The problem has been gradually worsening. The cough is Non-productive. Associated symptoms include headaches, nasal congestion, postnasal drip, rhinorrhea, a sore throat and sweats. Pertinent negatives include no chest pain, chills, ear congestion, ear pain, fever, heartburn, hemoptysis, myalgias, rash, shortness of breath, weight loss or wheezing. Nothing aggravates the symptoms. Treatments tried: dayquil, flonase. The treatment provided mild relief.     Constitution: Negative for chills and fever.   HENT:  Positive for postnasal drip and sore throat. Negative for ear pain.    Cardiovascular:  Negative for chest pain.   Respiratory:  Positive for cough. Negative for bloody sputum, shortness of breath and wheezing.    Gastrointestinal:  Negative for heartburn.   Musculoskeletal:  Negative for muscle ache.   Skin:  Negative for rash.   Neurological:  Positive for headaches.     Objective:      Physical Exam   Constitutional: She is oriented to person, place, and time. She appears well-developed. She is cooperative.  Non-toxic appearance. She does not appear ill. No distress.   HENT:   Head: Normocephalic and atraumatic.   Ears:   Right Ear: Hearing, tympanic membrane, external ear and ear canal normal.   Left Ear: Hearing, tympanic membrane, external ear and ear canal normal.   Nose: Congestion present. No mucosal edema, rhinorrhea or nasal deformity. No epistaxis. Right sinus exhibits no maxillary sinus tenderness and no frontal sinus tenderness. Left sinus " exhibits no maxillary sinus tenderness and no frontal sinus tenderness.   Mouth/Throat: Uvula is midline, oropharynx is clear and moist and mucous membranes are normal. No trismus in the jaw. Normal dentition. No uvula swelling. No oropharyngeal exudate, posterior oropharyngeal edema or posterior oropharyngeal erythema.   Eyes: Conjunctivae and lids are normal. No scleral icterus.   Neck: Trachea normal and phonation normal. Neck supple. No edema present. No erythema present. No neck rigidity present.   Cardiovascular: Normal rate, regular rhythm, normal heart sounds and normal pulses.   Pulmonary/Chest: Effort normal and breath sounds normal. No respiratory distress. She has no decreased breath sounds. She has no rhonchi.   Abdominal: Normal appearance.   Musculoskeletal: Normal range of motion.         General: No deformity. Normal range of motion.   Neurological: She is alert and oriented to person, place, and time. She exhibits normal muscle tone. Coordination normal.   Skin: Skin is warm, dry, intact, not diaphoretic and not pale.   Psychiatric: Her speech is normal and behavior is normal. Judgment and thought content normal.   Nursing note and vitals reviewed.      Results for orders placed or performed in visit on 10/12/22   POCT COVID-19 Rapid Screening   Result Value Ref Range    POC Rapid COVID Negative Negative     Acceptable Yes    POCT Influenza A/B MOLECULAR   Result Value Ref Range    POC Molecular Influenza A Ag Positive (A) Negative, Not Reported    POC Molecular Influenza B Ag Negative Negative, Not Reported     Acceptable Yes        Assessment:       1. Influenza A    2. Cough, unspecified type          Plan:         Labs reviewed with patient. RTC and ED precautions discussed.   Discussed proper use and side effects of prescribed and OTC medications recommended for symptomatic relief.       Influenza A  -     oseltamivir (TAMIFLU) 75 MG capsule; Take 1 capsule (75 mg  total) by mouth 2 (two) times daily. for 5 days  Dispense: 10 capsule; Refill: 0    Cough, unspecified type  -     POCT COVID-19 Rapid Screening  -     POCT Influenza A/B MOLECULAR       Patient Instructions       FLU  Your Rapid FLU test was POSITIVE FOR INFLUENZA  If your condition worsens or fails to improve we recommend that you receive another evaluation at the ER immediately or contact your PCP to discuss your concerns or return here. You must understand that you've received an urgent care treatment only and that you may be released before all your medical problems are known or treated. You the patient will arrange for [facility] care as instructed.     -  Take full course of Tamilfu as prescribed     -  Rest and fluids are also important.   -  Tylenol or ibuprofen can also be used as directed for pain unless you have an allergy to them or medical condition such as stomach ulcers, kidney or liver disease or blood thinners etc for which you should not be taking these type of medications.   - Do not share any utensils or share drinks   - Wash hands frequently         - You can take over-the-counter claritin, zyrtec, allegra, or xyzal as directed. These are antihistamines that can help with runny nose, nasal congestion, sneezing, and helps to dry up post-nasal drip, which usually causes sore throat and cough.               - If you do NOT have high blood pressure, you may use a decongestant form (D)  of this medication (ie. Claritin- D, zyrtec-D, allegra-D) or if you do not take the D form, you can take sudafed (pseudoephedrine) over the counter, which is a decongestant. Do NOT take two decongestant (D) medications at the same time (such as mucinex-D and claritin-D or plain sudafed and claritin D). Dextromethorphan (DM) is a cough suppressant over the counter (ie. mucinex DM, robitussin, delsym; dayquil/nyquil has DM as well.)    -If you DO have high blood pressure, you may take Coricidin HBP for sinus congestion  and Delsym for cough.      - You can use Flonase (fluticasone) nasal spray as directed for sinus congestion and postnasal drip. This is a steroid nasal spray that works locally over time to decrease the inflammation in your nose/sinuses and help with allergic symptoms. This is not an quick- relief spray like afrin, but it works well if used daily.  Discontinue if you develop nose bleed  - Use nasal saline prior to Flonase.  - Use Ocean Spray Nasal Saline 1-3 puffs each nostril every 2-3 hours then blow out onto tissue. This is to irrigate the nasal passage way to clear the sinus openings. Use until sinus problem resolved.     - You can take plain Mucinex (guaifenesin) 1200 mg twice a day to help loosen mucous.      - Warm salt water gargles can help with sore throat     - Warm tea with honey can help with cough. Honey is a natural cough suppressant.    - Acetaminophen (tylenol) or Ibuprofen (advil,motrin) as directed as needed for fever/pain. Avoid tylenol if you have a history of liver disease. Do not take ibuprofen if you have a history of GI bleeding, kidney disease, or if you take blood thinners.     - You must understand that you have received an Urgent Care treatment only and that you may be released before all of your medical problems are known or treated.   - You, the patient, will arrange for follow up care as instructed.   - If your condition worsens or fails to improve we recommend that you receive another evaluation at the ER immediately or contact your PCP to discuss your concerns or return here.   - Follow up with your PCP or specialty clinic as directed in the next 1-2 weeks if not improved or as needed.  You can call [phone] to schedule an appointment with the appropriate provider.

## 2022-10-13 NOTE — ED PROVIDER NOTES
Encounter Date: 10/12/2022       History     Chief Complaint   Patient presents with    Influenza     Pt reports that she tested positive for Flu A this morning. Pt states that she is experiencing dry heaving and believes that she may be dehydrated.      Pt presents with c/o Feeling dehydrated after flu diagnosis today.  She reports multiple episodes of vomiting and reports vomiting bile at this time.  She shows no signs of distress at this time.    Review of patient's allergies indicates:  No Known Allergies  No past medical history on file.  No past surgical history on file.  No family history on file.  Social History     Tobacco Use    Smoking status: Some Days     Types: Vaping with nicotine    Smokeless tobacco: Never   Substance Use Topics    Drug use: No     Review of Systems   Constitutional:  Negative for fever.   HENT:  Negative for sore throat.    Respiratory:  Negative for shortness of breath.    Cardiovascular:  Negative for chest pain.   Gastrointestinal:  Positive for nausea and vomiting.   Genitourinary:  Negative for dysuria.   Musculoskeletal:  Negative for back pain.   Skin:  Negative for rash.   Neurological:  Negative for weakness.   Hematological:  Does not bruise/bleed easily.     Physical Exam     Initial Vitals [10/12/22 2102]   BP Pulse Resp Temp SpO2   129/86 102 20 99.7 °F (37.6 °C) 98 %      MAP       --         Physical Exam    Nursing note and vitals reviewed.  Constitutional: She appears well-developed and well-nourished.   HENT:   Head: Normocephalic and atraumatic.   Eyes: EOM are normal. Pupils are equal, round, and reactive to light.   Neck: Neck supple.   Normal range of motion.  Cardiovascular:  Normal rate, regular rhythm, normal heart sounds and intact distal pulses.           Pulmonary/Chest: Breath sounds normal.   Abdominal: Abdomen is soft. Bowel sounds are normal.   Musculoskeletal:         General: Normal range of motion.      Cervical back: Normal range of motion and  neck supple.     Neurological: She is alert and oriented to person, place, and time. She has normal strength and normal reflexes.   Skin: Skin is warm and dry.       ED Course   Procedures  Labs Reviewed - No data to display       Imaging Results    None          Medications   sodium chloride 0.9% bolus 1,000 mL (0 mLs Intravenous Stopped 10/12/22 2242)     Medical Decision Making:   ED Management:  I discussed with patient and/or family/caretaker that evaluation in the ED does not suggest any emergent or life threatening medical conditions requiring immediate intervention beyond what was provided in the ED, and I believe patient is safe for discharge. Regardless, an unremarkable evaluation in the ED does not preclude the development or presence of a serious of life threatening condition. As such, patient was instructed to return immediately for any worsening or change in current symptoms.                          Clinical Impression:   Final diagnoses:  [J10.1] Influenza A (Primary)  [E86.0] Dehydration      ED Disposition Condition    Discharge Stable          ED Prescriptions       Medication Sig Dispense Start Date End Date Auth. Provider    ibuprofen (ADVIL,MOTRIN) 800 MG tablet Take 1 tablet (800 mg total) by mouth every 6 (six) hours as needed for Pain. 20 tablet 10/12/2022 -- Hugo Hood NP    ondansetron (ZOFRAN-ODT) 4 MG TbDL Take 1 tablet (4 mg total) by mouth every 6 (six) hours as needed (Nausea). 20 tablet 10/12/2022 -- Hugo Hood NP          Follow-up Information    None          Hugo Hood NP  10/13/22 6563

## 2022-10-31 ENCOUNTER — TELEPHONE (OUTPATIENT)
Dept: URGENT CARE | Facility: CLINIC | Age: 22
End: 2022-10-31

## 2022-10-31 ENCOUNTER — OFFICE VISIT (OUTPATIENT)
Dept: URGENT CARE | Facility: CLINIC | Age: 22
End: 2022-10-31
Payer: MEDICAID

## 2022-10-31 VITALS
OXYGEN SATURATION: 99 % | HEART RATE: 94 BPM | DIASTOLIC BLOOD PRESSURE: 80 MMHG | HEIGHT: 64 IN | TEMPERATURE: 99 F | BODY MASS INDEX: 30.9 KG/M2 | RESPIRATION RATE: 16 BRPM | SYSTOLIC BLOOD PRESSURE: 132 MMHG | WEIGHT: 181 LBS

## 2022-10-31 DIAGNOSIS — H66.91 RIGHT OTITIS MEDIA, UNSPECIFIED OTITIS MEDIA TYPE: Primary | ICD-10-CM

## 2022-10-31 DIAGNOSIS — R05.9 COUGH, UNSPECIFIED TYPE: ICD-10-CM

## 2022-10-31 PROCEDURE — 99213 OFFICE O/P EST LOW 20 MIN: CPT | Mod: S$GLB,,,

## 2022-10-31 PROCEDURE — 1159F MED LIST DOCD IN RCRD: CPT | Mod: CPTII,S$GLB,,

## 2022-10-31 PROCEDURE — 3075F PR MOST RECENT SYSTOLIC BLOOD PRESS GE 130-139MM HG: ICD-10-PCS | Mod: CPTII,S$GLB,,

## 2022-10-31 PROCEDURE — 3079F DIAST BP 80-89 MM HG: CPT | Mod: CPTII,S$GLB,,

## 2022-10-31 PROCEDURE — 99213 PR OFFICE/OUTPT VISIT, EST, LEVL III, 20-29 MIN: ICD-10-PCS | Mod: S$GLB,,,

## 2022-10-31 PROCEDURE — 1160F PR REVIEW ALL MEDS BY PRESCRIBER/CLIN PHARMACIST DOCUMENTED: ICD-10-PCS | Mod: CPTII,S$GLB,,

## 2022-10-31 PROCEDURE — 1160F RVW MEDS BY RX/DR IN RCRD: CPT | Mod: CPTII,S$GLB,,

## 2022-10-31 PROCEDURE — 3079F PR MOST RECENT DIASTOLIC BLOOD PRESSURE 80-89 MM HG: ICD-10-PCS | Mod: CPTII,S$GLB,,

## 2022-10-31 PROCEDURE — 3075F SYST BP GE 130 - 139MM HG: CPT | Mod: CPTII,S$GLB,,

## 2022-10-31 PROCEDURE — 1159F PR MEDICATION LIST DOCUMENTED IN MEDICAL RECORD: ICD-10-PCS | Mod: CPTII,S$GLB,,

## 2022-10-31 RX ORDER — BENZONATATE 200 MG/1
200 CAPSULE ORAL 3 TIMES DAILY PRN
Qty: 30 CAPSULE | Refills: 0 | Status: SHIPPED | OUTPATIENT
Start: 2022-10-31

## 2022-10-31 RX ORDER — AMOXICILLIN 875 MG/1
875 TABLET, FILM COATED ORAL EVERY 12 HOURS
Qty: 14 TABLET | Refills: 0 | Status: SHIPPED | OUTPATIENT
Start: 2022-10-31 | End: 2022-11-07

## 2022-10-31 RX ORDER — AMOXICILLIN 500 MG/1
500 CAPSULE ORAL 2 TIMES DAILY
COMMUNITY
End: 2022-10-31 | Stop reason: ALTCHOICE

## 2022-10-31 NOTE — PROGRESS NOTES
"Subjective:       Patient ID: Kerry Torres is a 22 y.o. female.    Vitals:  height is 5' 4" (1.626 m) and weight is 82.1 kg (181 lb). Her tympanic temperature is 98.8 °F (37.1 °C). Her blood pressure is 132/80 and her pulse is 94. Her respiration is 16 and oxygen saturation is 99%.     Chief Complaint: Ear fullness     Kerry Torres is a 22 y.o. female who presents for R ear pain which onset 2 days ago. No associated sxs. Patient denies hearing loss, foreign bodies, or ear discharge. She was recently dx with strep on 10/28 and has taken 3 days worth of Amoxicillin 500 mg bid. Patient denies any fever, chills, SOB, CP, abd pain, n/v/d, rash, dizziness, or numbness/tingling.    Ear Fullness   There is pain in the right ear. This is a new problem. The current episode started in the past 7 days. The problem occurs constantly. The problem has been unchanged. There has been no fever. The pain is at a severity of 0/10. The patient is experiencing no pain. Associated symptoms include coughing and rhinorrhea. Pertinent negatives include no abdominal pain, diarrhea, ear discharge, headaches, hearing loss, neck pain, rash, sore throat or vomiting. She has tried antibiotics for the symptoms.     Constitution: Negative for appetite change, chills, sweating, fatigue and fever.   HENT:  Positive for ear pain. Negative for ear discharge, foreign body in ear, tinnitus, hearing loss, congestion, postnasal drip, sinus pain, sinus pressure, sore throat and trouble swallowing.    Neck: Negative for neck pain.   Cardiovascular:  Negative for chest pain.   Respiratory:  Positive for cough. Negative for sputum production and shortness of breath.    Gastrointestinal:  Negative for abdominal pain, nausea, vomiting and diarrhea.   Musculoskeletal:  Negative for muscle ache.   Skin:  Negative for rash.   Neurological:  Negative for dizziness, headaches, numbness and tingling.     Objective:      Physical Exam   Constitutional: She is " oriented to person, place, and time. She appears well-developed. She is cooperative.  Non-toxic appearance. She does not appear ill. No distress.   HENT:   Head: Normocephalic and atraumatic.   Ears:   Right Ear: Hearing, external ear and ear canal normal.   Left Ear: Hearing, tympanic membrane, external ear and ear canal normal.   Nose: Nose normal. No nasal deformity.   Mouth/Throat: Oropharynx is clear and moist and mucous membranes are normal. Mucous membranes are moist.   R TM bulging and erythema. No perforation visualized. No retraction.  No effusion noted.  No discharge.      Comments: R TM bulging and erythema. No perforation visualized. No retraction.  No effusion noted.  No discharge.  Eyes: Conjunctivae and lids are normal. No scleral icterus. Extraocular movement intact   Neck: Trachea normal and phonation normal. Neck supple. No edema present. No erythema present. No neck rigidity present.   Cardiovascular: Normal rate, regular rhythm, normal heart sounds and normal pulses.   Pulmonary/Chest: Effort normal and breath sounds normal. No respiratory distress. She has no decreased breath sounds. She has no rhonchi.   Abdominal: Normal appearance.   Musculoskeletal: Normal range of motion.         General: No deformity. Normal range of motion.   Neurological: She is alert and oriented to person, place, and time. She exhibits normal muscle tone. Coordination normal.   Skin: Skin is warm, dry, intact, not diaphoretic and not pale.   Psychiatric: Her speech is normal and behavior is normal. Judgment and thought content normal.   Nursing note and vitals reviewed.      Assessment:       1. Right otitis media, unspecified otitis media type    2. Cough, unspecified type        Plan:         Right otitis media, unspecified otitis media type  -     amoxicillin (AMOXIL) 875 MG tablet; Take 1 tablet (875 mg total) by mouth every 12 (twelve) hours. for 7 days  Dispense: 14 tablet; Refill: 0    Cough, unspecified  type  -     benzonatate (TESSALON) 200 MG capsule; Take 1 capsule (200 mg total) by mouth 3 (three) times daily as needed for Cough.  Dispense: 30 capsule; Refill: 0    Patient currently on Amoxicillin 500 mg bid for streptococcal pharyngitis. Will change to Amoxicillin 875 mg bid x 7 days. Patient agrees with plan and verbalizes understanding.    Discussed with patient all pertinent information and results. Discussed patient diagnosis and plan of treatment. Patient was given all follow up and return instructions. All questions and concerns were addressed at this time. Patient expresses understanding of information and instructions, and is comfortable with plan.    Patient was instructed to return to clinic or go to ED immediately for any worsening or change in current symptoms.

## 2022-10-31 NOTE — TELEPHONE ENCOUNTER
Pt called clinic asking is she could take her Amoxicillin now or wait 8 hours. Pt was told to wait before taking it.

## 2023-04-04 ENCOUNTER — OFFICE VISIT (OUTPATIENT)
Dept: URGENT CARE | Facility: CLINIC | Age: 23
End: 2023-04-04
Payer: MEDICAID

## 2023-04-04 VITALS
HEART RATE: 75 BPM | SYSTOLIC BLOOD PRESSURE: 117 MMHG | OXYGEN SATURATION: 99 % | WEIGHT: 185 LBS | HEIGHT: 64 IN | RESPIRATION RATE: 16 BRPM | DIASTOLIC BLOOD PRESSURE: 84 MMHG | BODY MASS INDEX: 31.58 KG/M2 | TEMPERATURE: 97 F

## 2023-04-04 DIAGNOSIS — N94.6 DYSMENORRHEA: Primary | ICD-10-CM

## 2023-04-04 LAB
B-HCG UR QL: NEGATIVE
BILIRUB UR QL STRIP: NEGATIVE
CTP QC/QA: YES
GLUCOSE UR QL STRIP: NEGATIVE
KETONES UR QL STRIP: NEGATIVE
LEUKOCYTE ESTERASE UR QL STRIP: NEGATIVE
PH, POC UA: 5
POC BLOOD, URINE: POSITIVE
POC NITRATES, URINE: NEGATIVE
PROT UR QL STRIP: NEGATIVE
SP GR UR STRIP: 1.02 (ref 1–1.03)
UROBILINOGEN UR STRIP-ACNC: ABNORMAL (ref 0.1–1.1)

## 2023-04-04 PROCEDURE — 99214 OFFICE O/P EST MOD 30 MIN: CPT | Mod: S$GLB,,, | Performed by: PHYSICIAN ASSISTANT

## 2023-04-04 PROCEDURE — 81025 URINE PREGNANCY TEST: CPT | Mod: S$GLB,,, | Performed by: PHYSICIAN ASSISTANT

## 2023-04-04 PROCEDURE — 99214 PR OFFICE/OUTPT VISIT, EST, LEVL IV, 30-39 MIN: ICD-10-PCS | Mod: S$GLB,,, | Performed by: PHYSICIAN ASSISTANT

## 2023-04-04 PROCEDURE — 81025 POCT URINE PREGNANCY: ICD-10-PCS | Mod: S$GLB,,, | Performed by: PHYSICIAN ASSISTANT

## 2023-04-04 PROCEDURE — 81003 URINALYSIS AUTO W/O SCOPE: CPT | Mod: QW,S$GLB,, | Performed by: PHYSICIAN ASSISTANT

## 2023-04-04 PROCEDURE — 81003 POCT URINALYSIS, DIPSTICK, AUTOMATED, W/O SCOPE: ICD-10-PCS | Mod: QW,S$GLB,, | Performed by: PHYSICIAN ASSISTANT

## 2023-04-04 RX ORDER — NAPROXEN 500 MG/1
500 TABLET ORAL 2 TIMES DAILY PRN
Qty: 28 TABLET | Refills: 0 | Status: SHIPPED | OUTPATIENT
Start: 2023-04-04 | End: 2023-04-18

## 2023-04-04 NOTE — PATIENT INSTRUCTIONS
Drink plenty of fluids. NAPROXEN up to 2x daily (with food) for pain as needed. A Heating pad may help as well as more frequent exercise.     Please follow up with OBGYN if you continue to have consistently painful periods.

## 2023-04-04 NOTE — LETTER
April 4, 2023      [facility]  [address] [place] [address] [address] [address]  Phone: [phone]  Fax: [phone]       Patient: Kerry Torres   YOB: 2000  Date of Visit: 04/04/2023    To Whom It May Concern:    Bridger Torres  was at [facility] on 04/04/2023. The patient may return to work/school on 04/05/2023 with no restrictions. If you have any questions or concerns, or if I can be of further assistance, please do not hesitate to contact me.    Sincerely,    Kezia Rangel, RT

## 2023-04-04 NOTE — PROGRESS NOTES
"Subjective:      Patient ID: Kerry Torrse is a 23 y.o. female.    Vitals:  height is 5' 4" (1.626 m) and weight is 83.9 kg (185 lb). Her tympanic temperature is 97.3 °F (36.3 °C). Her blood pressure is 117/84 and her pulse is 75. Her respiration is 16 and oxygen saturation is 99%.     Chief Complaint: Abdominal Pain    Patient is present with dull headache and pelvic pain (menstrual cramps). Pt states that the pain was severe this morning and she missed class. Pain and menstrual bleeding started at the same time. Denies dysuria, hematuria, urinary frequency, vaginal d/c (besides bleeding), fever, N/V. Hx of occasional severe cramping with her cycles. Denies chance of pregnancy. Denies passing clots or heavy amt of menses     Abdominal Pain  This is a new problem. The current episode started today. The onset quality is gradual. The problem occurs constantly. The most recent episode lasted 4 hours. The problem has been unchanged. The pain is at a severity of 7/10. The pain is moderate. The quality of the pain is cramping. Associated symptoms include headaches. Pertinent negatives include no anorexia, arthralgias, belching, constipation, diarrhea, dysuria, fever, flatus, frequency, hematochezia, hematuria, melena, myalgias, nausea, vomiting or weight loss. Nothing aggravates the pain. The pain is relieved by Nothing. She has tried nothing for the symptoms. There is no history of abdominal surgery, colon cancer, Crohn's disease, gallstones, GERD, irritable bowel syndrome, pancreatitis, PUD or ulcerative colitis. Patient's medical history does not include kidney stones and UTI.     Constitution: Positive for fatigue. Negative for chills and fever.   HENT:  Negative for congestion, sinus pain, sinus pressure and sore throat.    Neck: Negative for neck stiffness and neck swelling.   Cardiovascular:  Negative for chest pain, leg swelling and palpitations.   Eyes:  Negative for eye itching, eye pain and eye redness. "   Gastrointestinal:  Positive for abdominal pain. Negative for history of abdominal surgery, nausea, vomiting, constipation, diarrhea and bright red blood in stool.   Genitourinary:  Negative for dysuria, frequency and hematuria.   Musculoskeletal:  Negative for joint pain and muscle ache.   Neurological:  Positive for headaches.    Objective:     Physical Exam   Constitutional: She is oriented to person, place, and time. She appears well-developed. She is cooperative.  Non-toxic appearance. She does not appear ill. No distress.   HENT:   Head: Normocephalic and atraumatic.   Ears:   Right Ear: Hearing, tympanic membrane, external ear and ear canal normal.   Left Ear: Hearing, tympanic membrane, external ear and ear canal normal.   Nose: Nose normal. No mucosal edema, rhinorrhea or nasal deformity. No epistaxis. Right sinus exhibits no maxillary sinus tenderness and no frontal sinus tenderness. Left sinus exhibits no maxillary sinus tenderness and no frontal sinus tenderness.   Mouth/Throat: Uvula is midline, oropharynx is clear and moist and mucous membranes are normal. No trismus in the jaw. Normal dentition. No uvula swelling. No oropharyngeal exudate, posterior oropharyngeal edema or posterior oropharyngeal erythema.   Eyes: Conjunctivae and lids are normal. No scleral icterus.   Neck: Trachea normal and phonation normal. Neck supple. No edema present. No erythema present. No neck rigidity present.   Cardiovascular: Normal rate, regular rhythm, normal heart sounds and normal pulses.   Pulmonary/Chest: Effort normal and breath sounds normal. No respiratory distress. She has no decreased breath sounds. She has no wheezes. She has no rhonchi. She has no rales.   Abdominal: Normal appearance. flat abdomen There is no abdominal tenderness. There is no rebound, no guarding, no left CVA tenderness and no right CVA tenderness.   Musculoskeletal: Normal range of motion.         General: No deformity. Normal range of  motion.   Neurological: She is alert and oriented to person, place, and time. She exhibits normal muscle tone. Coordination normal.   Skin: Skin is warm, dry, intact, not diaphoretic and not pale.   Psychiatric: Her speech is normal and behavior is normal. Judgment and thought content normal.   Nursing note and vitals reviewed.    Results for orders placed or performed in visit on 04/04/23   POCT Urinalysis, Dipstick, Automated, W/O Scope   Result Value Ref Range    POC Blood, Urine Positive (A) Negative    POC Bilirubin, Urine Negative Negative    POC Urobilinogen, Urine Norm 0.1 - 1.1    POC Ketones, Urine Negative Negative    POC Protein, Urine Negative Negative    POC Nitrates, Urine Negative Negative    POC Glucose, Urine Negative Negative    pH, UA 5.0     POC Specific Gravity, Urine 1.025 1.003 - 1.029    POC Leukocytes, Urine Negative Negative   POCT urine pregnancy   Result Value Ref Range    POC Preg Test, Ur Negative Negative     Acceptable Yes        Assessment:     1. Dysmenorrhea        Plan:     Neg UPT. UA mild amt of blood - likely contamination from menses - no pyuria, neg nitrites.     Rx naproxen, recommend rest, increased fluids, heating pad.    Dysmenorrhea  -     POCT Urinalysis, Dipstick, Automated, W/O Scope  -     POCT urine pregnancy  -     naproxen (NAPROSYN) 500 MG tablet; Take 1 tablet (500 mg total) by mouth 2 (two) times daily as needed (cramps).  Dispense: 28 tablet; Refill: 0    Caroline Fusilier PA-C [facility]       Patient Instructions   Drink plenty of fluids. NAPROXEN up to 2x daily (with food) for pain as needed. A Heating pad may help as well as more frequent exercise.     Please follow up with OBGYN if you continue to have consistently painful periods.

## 2025-09-02 ENCOUNTER — OCCUPATIONAL HEALTH (OUTPATIENT)
Dept: URGENT CARE | Facility: CLINIC | Age: 25
End: 2025-09-02

## 2025-09-02 DIAGNOSIS — Z02.83 ENCOUNTER FOR DRUG SCREENING: Primary | ICD-10-CM

## 2025-09-02 LAB
CTP QC/QA: YES
POC 5 PANEL DRUG SCREEN: NEGATIVE